# Patient Record
Sex: MALE | Race: BLACK OR AFRICAN AMERICAN | NOT HISPANIC OR LATINO | URBAN - METROPOLITAN AREA
[De-identification: names, ages, dates, MRNs, and addresses within clinical notes are randomized per-mention and may not be internally consistent; named-entity substitution may affect disease eponyms.]

---

## 2017-08-18 NOTE — PATIENT DISCUSSION
(H25.13) Age-related nuclear cataract, bilateral - Assesment : Examination revealed cataract. Moderate OU with visual function affected. Glare testing shows change since last year. - Plan : Monitor for changes. Advised patient to call our office with decreased vision or increased symptoms. Advised nearing time where cataract surgery would become option. RV 1 Year for Cl Check / Complete Exam. Sooner if having problems or changes in vision.

## 2017-08-18 NOTE — PATIENT DISCUSSION
(N95.657) Keratoconjunct sicca, not specified as Sjogren's, bilateral - Assesment : Examination revealed Dry Eye Syndrome OU. - Plan : Monitor for changes. Advised patient to call our office with decreased vision or increased symptoms.

## 2017-11-20 ENCOUNTER — IMPORTED ENCOUNTER (OUTPATIENT)
Dept: URBAN - METROPOLITAN AREA CLINIC 9 | Facility: CLINIC | Age: 72
End: 2017-11-20

## 2017-11-21 ENCOUNTER — IMPORTED ENCOUNTER (OUTPATIENT)
Dept: URBAN - METROPOLITAN AREA CLINIC 9 | Facility: CLINIC | Age: 72
End: 2017-11-21

## 2018-05-22 ENCOUNTER — IMPORTED ENCOUNTER (OUTPATIENT)
Dept: URBAN - METROPOLITAN AREA CLINIC 9 | Facility: CLINIC | Age: 73
End: 2018-05-22

## 2018-08-27 NOTE — PATIENT DISCUSSION
(H25.628) Posterior subcapsular polar age-related cataract, right eye - Assesment : Examination revealed Posterior Subcapsular Polar Senile Cataract. - Plan : See plan #1.

## 2018-08-27 NOTE — PATIENT DISCUSSION
(H25.13) Age-related nuclear cataract, bilateral - Assesment : Examination revealed cataract. Pt is bothered and symptomatic. Monovision CL wearer. - Plan : Advised Pt of condition of cataracts and discussed option of cataract extraction to improve visual function vs updating CLRx. Discussed procedure, risks, and benefits. All questions answered and handout given.  Pt will consider option and call office if wishes to proceed with CE.  RTC in 1 year for CL Exam, sooner if problems, changes, or would like to proceed with CE.

## 2018-08-27 NOTE — PATIENT DISCUSSION
(Z72.0) Tobacco use - Assesment : Pt occasionally smokes cigars. - Plan : Pt aware of health risks associated with smoking.

## 2019-01-02 ENCOUNTER — IMPORTED ENCOUNTER (OUTPATIENT)
Dept: URBAN - METROPOLITAN AREA CLINIC 9 | Facility: CLINIC | Age: 74
End: 2019-01-02

## 2019-01-22 NOTE — PATIENT DISCUSSION
(H25.13) Age-related nuclear cataract, bilateral - Assesment : Examination revealed cataract. Pt is bothered and symptomatic. PATIENT IS USED TO MONOVISION. TARGET OD FOR DISTANCE AND OS FOR READING. ANY CHOICE OF LENS HAS A COMPROMISE. PATIENT WILL COMPROMISE A LITTLE MORE FOR READING.  ****VERIFY ASCAN MEASUREMENTS PRIOR TO FINAL CATARACT SURGERY PLAN***  Monovision CL wearer. MILD ASTIGMATISM. SIGNIFICANT CATARACTS OU. PATIENT FUNCTIONS WELL WITH MONOVISION. PT SAID HE DOESN'T HAVE A LOT OF THE ISSUES THAT HE  READ ABOUT, SUCH AS DOUBLE VISION. PATIENT WILL COMPROMISE FOR READING. ANATOMY OF CATARACT DISCUSSED WITH PATIENT. THE LIGHT SCATTERS AND IT WON'T FOCUS ON THE RETINA PROPERLY. THE QUALITY AND CLARITY OF VISION WILL BE IMPROVED WITH CATARACT SURGERY. PATIENT CAN SELECT A LENS THAT WILL GIVE THE MAXIMUM VISION FOR DISTANCE. UPON FURTHER DISCUSSION IT WOULD MAKE SENSE TO PROCEED WITH MONOVISION. PATIENT AGREES WITH THIS DECISION AND WISHES TO PROCEED WITH MONOVISION AT THE TIME OF CATARACT SURGERY. FOCUSING OD FOR DISTANCE AND OS FOR NEAR - Plan : Risks, Benefits and Alternatives were discussed with patient at length for Cataract Surgery. Visual symptoms are consistent with Cataract findings on examination and current refraction no longer provides satisfactory vision. Patient understands and desires surgery. All questions answered. Risks, Benefits and Alternatives discussed at length for IOL placement. Patient will need to wear glasses for  EYE:OD IOL TYPE: MONOVISION POST OPERATIVE TARGET: DISTANCE  RECOMMENDED PACKAGE:  TP PT PREFERRED PACKAGE:  TP  OS TO FOLLOW WITH A LENS THAT IS STRONGER FOR READING  PATIENT TO TALK TO SURGERY COUNSELOR TODAY.

## 2019-01-30 NOTE — PATIENT DISCUSSION
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 ORA was done in OR on operated eye. - Plan : Discussed signs and symptoms of infection and retinal detachments. Do not rub operated eye.  Follow drop schedule If redness,pain,decreased vision, flashes or floaters occur then contact clinic. 1 WEEK

## 2019-02-04 NOTE — PATIENT DISCUSSION
(H25.12) Age-related nuclear cataract, left eye - Assesment : Examination revealed cataract. Pt is bothered and symptomatic. PATIENT IS USED TO MONOVISION. TARGET OD FOR DISTANCE AND OS FOR READING. ANY CHOICE OF LENS HAS A COMPROMISE. PATIENT WILL COMPROMISE A LITTLE MORE FOR READING.  ****VERIFY ASCAN MEASUREMENTS PRIOR TO FINAL CATARACT SURGERY PLAN***  Monovision CL wearer. MILD ASTIGMATISM. SIGNIFICANT CATARACTS OU. PATIENT FUNCTIONS WELL WITH MONOVISION. PT SAID HE DOESN'T HAVE A LOT OF THE ISSUES THAT HE  READ ABOUT, SUCH AS DOUBLE VISION. PATIENT WILL COMPROMISE FOR READING. ANATOMY OF CATARACT DISCUSSED WITH PATIENT. THE LIGHT SCATTERS AND IT WON'T FOCUS ON THE RETINA PROPERLY. THE QUALITY AND CLARITY OF VISION WILL BE IMPROVED WITH CATARACT SURGERY. PATIENT CAN SELECT A LENS THAT WILL GIVE THE MAXIMUM VISION FOR DISTANCE. UPON FURTHER DISCUSSION IT WOULD MAKE SENSE TO PROCEED WITH MONOVISION. PATIENT AGREES WITH THIS DECISION AND WISHES TO PROCEED WITH MONOVISION AT THE TIME OF CATARACT SURGERY. FOCUSING OD FOR DISTANCE AND OS FOR NEAR. PATIENT IS HAPPY WITH PRESENT LEVEL OF VISION AT THIS TIME. WE COULD HOLD OFF ON CATARACT SURGERY FOR OS AT THIS TIME. OS WOULD GET SOME IMPROVEMENT WITH THE SURGERY BUT PATIENT IS OK WITH HIS READING VISION AT THIS TIME.   PATIENT WOULD LIKE TO HOLD OFF ON SURGERY OS. - Plan : PATIENT IS HAPPY WITH CURRENT LEVEL OF VISION AND WOULD LIKE TO HOLD OFF ON SURGERY OS   3-4 WEEKS

## 2019-02-04 NOTE — PATIENT DISCUSSION
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 NORMAL POST OP APPEARANCE - Plan : Signs and symptoms of infection and retinal detachment are outlined in your surgical packet. Do not rub operated eye. Follow drop schedule. If redness, pain, decreased vision, flashes or floaters occur then contact clinic.

## 2019-03-04 NOTE — PATIENT DISCUSSION
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 - Plan : Signs and symptoms of infection and retinal detachment are outlined in your surgical packet. Do not rub operated eye. Follow drop schedule. If redness, pain, decreased vision, flashes or floaters occur then contact clinic. AUGUST EXAM WITH DR. NOGUEIRA

## 2019-08-19 NOTE — PATIENT DISCUSSION
(H26.061) Other secondary cataract, right eye - Assesment : Mild posterior capsule opacification present. - Plan : Monitor for changes. Advised patient to call our office with decreased vision or increased symptoms.

## 2019-08-19 NOTE — PATIENT DISCUSSION
(H25.12) Age-related nuclear cataract, left eye - Assesment : Examination revealed cataract. Pt not bothered by OS at this time. - Plan : Monitor for changes. Advised patient of condition of cataract OS and discussed symptoms of cataract worsening and becoming more bothersome. Pt to call if vision changes or becomes more bothered by visual symptoms before next appt. RTC in 1 year for Exam, sooner if problems or changes occur.

## 2019-08-19 NOTE — PATIENT DISCUSSION
(V39.330) Vitreous degeneration, left eye - Assesment : Examination revealed PVD. No changes reported. No holes or tears noted today. - Plan : Monitor for changes. Advised patient to call our office with any decreased vision or any increase in flashes and/or floaters.

## 2020-01-24 ENCOUNTER — IMPORTED ENCOUNTER (OUTPATIENT)
Dept: URBAN - METROPOLITAN AREA CLINIC 9 | Facility: CLINIC | Age: 75
End: 2020-01-24

## 2020-10-07 ENCOUNTER — IMPORTED ENCOUNTER (OUTPATIENT)
Dept: URBAN - METROPOLITAN AREA CLINIC 9 | Facility: CLINIC | Age: 75
End: 2020-10-07

## 2021-10-16 ASSESSMENT — VISUAL ACUITY
OS_SC: 20/50 -2 SN
OS_SC: 20/25 - SN
OS_SC: 20/20 -2 SN
OD_CC: 20/20 SN
OS_CC: 20/25 SN
OD_CC: 20/20 -2 SN
OD_CC: 20/20 -2 SN
OS_SC: 20/20 - SN
OS_SC: 20/40 SN
OD_SC: 20/25 -2 SN
OD_SC: 20/25 -2 SN
OD_CC: 20/20 -2 SN
OS_SC: 20/25 - SN
OD_SC: 20/50 +2 SN
OS_CC: 20/20 SN
OD_SC: 20/20 -2 SN
OS_CC: 20/25 -2 SN
OS_CC: 20/20 - SN
OD_SC: 20/40 +2 SN
OD_SC: 20/25 - SN

## 2021-10-16 ASSESSMENT — KERATOMETRY
OD_K2POWER_DIOPTERS: 43.75
OD_K2POWER_DIOPTERS: 43.75
OS_AXISANGLE2_DEGREES: 90
OS_K1POWER_DIOPTERS: 42.75
OD_AXISANGLE_DEGREES: 151
OS_K2POWER_DIOPTERS: 43
OS_K2POWER_DIOPTERS: 43
OS_AXISANGLE_DEGREES: 68
OS_K1POWER_DIOPTERS: 43
OD_K1POWER_DIOPTERS: 43.25
OS_AXISANGLE_DEGREES: 180
OS_AXISANGLE2_DEGREES: 175
OS_K2POWER_DIOPTERS: 43.5
OD_K2POWER_DIOPTERS: 44
OS_AXISANGLE2_DEGREES: 158
OS_AXISANGLE_DEGREES: 180
OS_K1POWER_DIOPTERS: 43.25
OS_K2POWER_DIOPTERS: 43
OS_AXISANGLE2_DEGREES: 90
OS_AXISANGLE_DEGREES: 85
OS_K1POWER_DIOPTERS: 43
OD_K1POWER_DIOPTERS: 43.25
OD_AXISANGLE_DEGREES: 133
OD_AXISANGLE_DEGREES: 127
OD_AXISANGLE2_DEGREES: 64
OD_K1POWER_DIOPTERS: 43.25
OD_K2POWER_DIOPTERS: 43.5
OD_AXISANGLE2_DEGREES: 37
OD_AXISANGLE_DEGREES: 154
OD_AXISANGLE2_DEGREES: 43
OD_AXISANGLE2_DEGREES: 61
OD_K1POWER_DIOPTERS: 43.5

## 2021-10-16 ASSESSMENT — TONOMETRY
OD_IOP_MMHG: 11
OS_IOP_MMHG: 14
OD_IOP_MMHG: 15
OS_IOP_MMHG: 10
OD_IOP_MMHG: 13
OD_IOP_MMHG: 14
OS_IOP_MMHG: 14
OS_IOP_MMHG: 17

## 2021-12-02 ENCOUNTER — ESTABLISHED PATIENT (OUTPATIENT)
Dept: URBAN - METROPOLITAN AREA CLINIC 9 | Facility: CLINIC | Age: 76
End: 2021-12-02

## 2021-12-02 DIAGNOSIS — H52.223: ICD-10-CM

## 2021-12-02 DIAGNOSIS — E11.9: ICD-10-CM

## 2021-12-02 DIAGNOSIS — H43.813: ICD-10-CM

## 2021-12-02 PROCEDURE — 92134 CPTRZ OPH DX IMG PST SGM RTA: CPT

## 2021-12-02 PROCEDURE — 92015 DETERMINE REFRACTIVE STATE: CPT

## 2021-12-02 PROCEDURE — 92014 COMPRE OPH EXAM EST PT 1/>: CPT

## 2021-12-02 ASSESSMENT — KERATOMETRY
OD_K2POWER_DIOPTERS: 43.75
OD_AXISANGLE_DEGREES: 117
OS_AXISANGLE2_DEGREES: 122
OD_K1POWER_DIOPTERS: 43.00
OS_K2POWER_DIOPTERS: 43.25
OS_K1POWER_DIOPTERS: 43.00
OD_AXISANGLE2_DEGREES: 27
OS_AXISANGLE_DEGREES: 32

## 2021-12-02 ASSESSMENT — VISUAL ACUITY
OU_CC: 20/20-1
OU_SC: 20/25-1
OS_SC: 20/40-2
OD_CC: 20/20-2
OS_CC: 20/20-2
OD_SC: 20/30-1

## 2021-12-02 ASSESSMENT — TONOMETRY
OS_IOP_MMHG: 15
OD_IOP_MMHG: 13

## 2022-06-20 RX ORDER — SITAGLIPTIN AND METFORMIN HYDROCHLORIDE 500; 50 MG/1; MG/1
TABLET, FILM COATED ORAL
COMMUNITY

## 2022-06-20 RX ORDER — FLUTICASONE PROPIONATE 50 MCG
SPRAY, SUSPENSION (ML) NASAL
COMMUNITY
Start: 2022-01-07 | End: 2022-09-12 | Stop reason: ALTCHOICE

## 2022-06-20 RX ORDER — EZETIMIBE AND SIMVASTATIN 10; 20 MG/1; MG/1
TABLET ORAL
COMMUNITY

## 2022-06-20 RX ORDER — ASPIRIN 81 MG/1
TABLET, CHEWABLE ORAL
COMMUNITY
End: 2022-09-12 | Stop reason: ALTCHOICE

## 2022-06-20 RX ORDER — DESOXIMETASONE 2.5 MG/G
CREAM TOPICAL
COMMUNITY

## 2022-09-12 PROBLEM — I10 ESSENTIAL HYPERTENSION: Status: ACTIVE | Noted: 2022-09-12

## 2022-09-12 PROBLEM — E78.2 MIXED DYSLIPIDEMIA: Status: ACTIVE | Noted: 2022-09-12

## 2022-09-12 PROBLEM — N52.9 MALE ERECTILE DYSFUNCTION, UNSPECIFIED: Status: ACTIVE | Noted: 2022-09-12

## 2022-09-12 PROBLEM — E11.9 DIABETES MELLITUS, TYPE II (HCC): Status: ACTIVE | Noted: 2022-09-12

## 2022-09-12 PROBLEM — R01.1 SYSTOLIC MURMUR: Status: ACTIVE | Noted: 2022-09-12

## 2022-09-12 PROBLEM — G47.33 OBSTRUCTIVE SLEEP APNEA SYNDROME: Status: ACTIVE | Noted: 2022-09-12

## 2022-09-12 PROBLEM — J30.9 ALLERGIC RHINITIS: Status: ACTIVE | Noted: 2022-09-12

## 2022-09-12 PROBLEM — K21.9 GASTRO-ESOPHAGEAL REFLUX DISEASE WITHOUT ESOPHAGITIS: Status: ACTIVE | Noted: 2022-09-12

## 2022-09-12 PROBLEM — M65.30 ACQUIRED TRIGGER FINGER: Status: ACTIVE | Noted: 2022-09-12

## 2022-09-12 PROBLEM — N40.0 BPH (BENIGN PROSTATIC HYPERPLASIA): Status: ACTIVE | Noted: 2022-09-12

## 2022-12-05 ENCOUNTER — COMPREHENSIVE EXAM (OUTPATIENT)
Dept: URBAN - METROPOLITAN AREA CLINIC 9 | Facility: CLINIC | Age: 77
End: 2022-12-05

## 2022-12-05 PROCEDURE — 92014 COMPRE OPH EXAM EST PT 1/>: CPT

## 2022-12-05 PROCEDURE — 92015 DETERMINE REFRACTIVE STATE: CPT

## 2022-12-05 ASSESSMENT — VISUAL ACUITY
OU_CC: J1+
OU_CC: 20/20
OU_SC: 20/30-1
OS_CC: J1
OS_CC: 20/20
OS_SC: 20/40-2
OD_CC: J1+
OD_CC: 20/20-1
OD_SC: 20/30-2

## 2022-12-05 ASSESSMENT — TONOMETRY
OD_IOP_MMHG: 13
OS_IOP_MMHG: 13

## 2022-12-05 ASSESSMENT — KERATOMETRY
OD_K1POWER_DIOPTERS: 43.00
OS_AXISANGLE_DEGREES: 0
OS_K2POWER_DIOPTERS: 43.00
OS_K1POWER_DIOPTERS: 43.00
OS_AXISANGLE2_DEGREES: 90
OD_K2POWER_DIOPTERS: 44.00
OD_AXISANGLE_DEGREES: 104
OD_AXISANGLE2_DEGREES: 14

## 2023-12-05 ENCOUNTER — COMPREHENSIVE EXAM (OUTPATIENT)
Dept: URBAN - METROPOLITAN AREA CLINIC 14 | Facility: CLINIC | Age: 78
End: 2023-12-05

## 2023-12-05 DIAGNOSIS — Z98.42: ICD-10-CM

## 2023-12-05 DIAGNOSIS — E11.9: ICD-10-CM

## 2023-12-05 DIAGNOSIS — H43.813: ICD-10-CM

## 2023-12-05 DIAGNOSIS — Z98.41: ICD-10-CM

## 2023-12-05 DIAGNOSIS — H04.123: ICD-10-CM

## 2023-12-05 DIAGNOSIS — H52.223: ICD-10-CM

## 2023-12-05 PROCEDURE — 92014 COMPRE OPH EXAM EST PT 1/>: CPT

## 2023-12-05 PROCEDURE — 92015 DETERMINE REFRACTIVE STATE: CPT

## 2023-12-05 ASSESSMENT — KERATOMETRY
OD_AXISANGLE_DEGREES: 98
OS_AXISANGLE2_DEGREES: 161
OS_K1POWER_DIOPTERS: 42.75
OD_K1POWER_DIOPTERS: 42.25
OS_AXISANGLE_DEGREES: 71
OD_K2POWER_DIOPTERS: 43.75
OD_AXISANGLE2_DEGREES: 8
OS_K2POWER_DIOPTERS: 43.00

## 2023-12-05 ASSESSMENT — VISUAL ACUITY
OD_CC: 20/25-1
OS_CC: 20/30+2
OU_CC: 20/20

## 2023-12-05 ASSESSMENT — TONOMETRY
OD_IOP_MMHG: 13
OS_IOP_MMHG: 12

## 2023-12-08 ENCOUNTER — ESTABLISHED PATIENT (OUTPATIENT)
Dept: URBAN - METROPOLITAN AREA CLINIC 11 | Facility: CLINIC | Age: 78
End: 2023-12-08

## 2023-12-08 DIAGNOSIS — H43.393: ICD-10-CM

## 2023-12-08 DIAGNOSIS — H43.813: ICD-10-CM

## 2023-12-08 PROCEDURE — 92201 OPSCPY EXTND RTA DRAW UNI/BI: CPT

## 2023-12-08 PROCEDURE — 99214 OFFICE O/P EST MOD 30 MIN: CPT

## 2023-12-08 ASSESSMENT — KERATOMETRY
OD_AXISANGLE2_DEGREES: 8
OD_K1POWER_DIOPTERS: 42.25
OS_AXISANGLE2_DEGREES: 161
OD_K2POWER_DIOPTERS: 43.75
OS_K2POWER_DIOPTERS: 43.00
OS_K1POWER_DIOPTERS: 42.75
OS_AXISANGLE_DEGREES: 71
OD_AXISANGLE_DEGREES: 98

## 2023-12-08 ASSESSMENT — TONOMETRY
OS_IOP_MMHG: 8
OD_IOP_MMHG: 8

## 2023-12-08 ASSESSMENT — VISUAL ACUITY
OD_CC: 20/30
OS_CC: 20/30-1

## 2023-12-19 ENCOUNTER — POST-OP (OUTPATIENT)
Dept: URBAN - METROPOLITAN AREA CLINIC 18 | Facility: CLINIC | Age: 78
End: 2023-12-19

## 2023-12-19 DIAGNOSIS — H43.812: ICD-10-CM

## 2023-12-19 DIAGNOSIS — Z98.890: ICD-10-CM

## 2023-12-19 DIAGNOSIS — H43.392: ICD-10-CM

## 2023-12-19 PROCEDURE — 99024 POSTOP FOLLOW-UP VISIT: CPT

## 2023-12-19 ASSESSMENT — KERATOMETRY
OD_K1POWER_DIOPTERS: 42.25
OS_K1POWER_DIOPTERS: 42.75
OS_AXISANGLE2_DEGREES: 161
OS_AXISANGLE_DEGREES: 71
OD_K2POWER_DIOPTERS: 43.75
OD_AXISANGLE_DEGREES: 98
OS_K2POWER_DIOPTERS: 43.00
OD_AXISANGLE2_DEGREES: 8

## 2023-12-19 ASSESSMENT — VISUAL ACUITY
OS_CC: 20/20
OD_CC: 20/20

## 2023-12-19 ASSESSMENT — TONOMETRY
OS_IOP_MMHG: 12
OD_IOP_MMHG: 12

## 2024-01-08 ENCOUNTER — POST-OP (OUTPATIENT)
Dept: URBAN - METROPOLITAN AREA CLINIC 11 | Facility: CLINIC | Age: 79
End: 2024-01-08

## 2024-01-08 DIAGNOSIS — Z98.890: ICD-10-CM

## 2024-01-08 DIAGNOSIS — H43.812: ICD-10-CM

## 2024-01-08 DIAGNOSIS — H43.392: ICD-10-CM

## 2024-01-08 PROCEDURE — 99024 POSTOP FOLLOW-UP VISIT: CPT

## 2024-01-08 ASSESSMENT — KERATOMETRY
OD_K2POWER_DIOPTERS: 43.75
OS_K1POWER_DIOPTERS: 42.75
OS_K2POWER_DIOPTERS: 43.00
OD_AXISANGLE_DEGREES: 98
OD_AXISANGLE2_DEGREES: 8
OS_AXISANGLE_DEGREES: 71
OD_K1POWER_DIOPTERS: 42.25
OS_AXISANGLE2_DEGREES: 161

## 2024-01-08 ASSESSMENT — TONOMETRY
OS_IOP_MMHG: 13
OD_IOP_MMHG: 15

## 2024-01-08 ASSESSMENT — VISUAL ACUITY
OD_CC: 20/25+2
OS_CC: 20/20-1
OU_CC: 20/20-1

## 2024-02-05 ENCOUNTER — SURGERY/PROCEDURE (OUTPATIENT)
Dept: URBAN - METROPOLITAN AREA SURGERY 6 | Facility: SURGERY | Age: 79
End: 2024-02-05

## 2024-02-05 DIAGNOSIS — H43.392: ICD-10-CM

## 2024-02-05 PROCEDURE — 67036 REMOVAL OF INNER EYE FLUID: CPT

## 2024-02-06 ENCOUNTER — POST-OP (OUTPATIENT)
Dept: URBAN - METROPOLITAN AREA CLINIC 18 | Facility: CLINIC | Age: 79
End: 2024-02-06

## 2024-02-06 DIAGNOSIS — Z98.890: ICD-10-CM

## 2024-02-06 PROCEDURE — 99024 POSTOP FOLLOW-UP VISIT: CPT

## 2024-02-06 ASSESSMENT — VISUAL ACUITY
OD_CC: 20/20-1
OS_CC: 20/25

## 2024-02-06 ASSESSMENT — KERATOMETRY
OD_K1POWER_DIOPTERS: 42.25
OD_K2POWER_DIOPTERS: 43.75
OD_AXISANGLE2_DEGREES: 8
OD_AXISANGLE_DEGREES: 98
OS_AXISANGLE_DEGREES: 71
OS_K1POWER_DIOPTERS: 42.75
OS_AXISANGLE2_DEGREES: 161
OS_K2POWER_DIOPTERS: 43.00

## 2024-02-06 ASSESSMENT — TONOMETRY
OD_IOP_MMHG: 11
OS_IOP_MMHG: 11

## 2024-03-01 ENCOUNTER — POST-OP (OUTPATIENT)
Dept: URBAN - METROPOLITAN AREA CLINIC 11 | Facility: CLINIC | Age: 79
End: 2024-03-01

## 2024-03-01 DIAGNOSIS — E11.9: ICD-10-CM

## 2024-03-01 DIAGNOSIS — Z98.890: ICD-10-CM

## 2024-03-01 PROCEDURE — 99024 POSTOP FOLLOW-UP VISIT: CPT

## 2024-03-01 ASSESSMENT — TONOMETRY
OD_IOP_MMHG: 14
OS_IOP_MMHG: 16

## 2024-03-01 ASSESSMENT — VISUAL ACUITY
OS_CC: 20/20-2
OD_CC: 20/30

## 2024-06-14 ENCOUNTER — FOLLOW UP (OUTPATIENT)
Dept: URBAN - METROPOLITAN AREA CLINIC 11 | Facility: CLINIC | Age: 79
End: 2024-06-14

## 2024-06-14 DIAGNOSIS — Z98.890: ICD-10-CM

## 2024-06-14 DIAGNOSIS — E11.9: ICD-10-CM

## 2024-06-14 PROCEDURE — 99213 OFFICE O/P EST LOW 20 MIN: CPT

## 2024-06-14 ASSESSMENT — VISUAL ACUITY
OD_CC: 20/20-2
OS_CC: 20/25+1

## 2024-06-14 ASSESSMENT — TONOMETRY
OS_IOP_MMHG: 9
OD_IOP_MMHG: 8

## 2024-07-08 ENCOUNTER — FOLLOW UP (OUTPATIENT)
Dept: URBAN - METROPOLITAN AREA CLINIC 16 | Facility: CLINIC | Age: 79
End: 2024-07-08

## 2024-07-30 ENCOUNTER — COMPREHENSIVE EXAM (OUTPATIENT)
Dept: URBAN - METROPOLITAN AREA CLINIC 10 | Facility: CLINIC | Age: 79
End: 2024-07-30

## 2024-07-30 ASSESSMENT — TONOMETRY
OS_IOP_MMHG: 11
OD_IOP_MMHG: 10

## 2024-07-30 ASSESSMENT — VISUAL ACUITY
OS_CC: 20/40-2
OD_CC: 20/60
OU_CC: 20/40-2

## 2025-07-31 ENCOUNTER — COMPREHENSIVE EXAM (OUTPATIENT)
Age: 80
End: 2025-07-31

## 2025-07-31 DIAGNOSIS — H04.123: ICD-10-CM

## 2025-07-31 DIAGNOSIS — E11.9: ICD-10-CM

## 2025-07-31 DIAGNOSIS — H52.223: ICD-10-CM

## 2025-07-31 PROCEDURE — 92014 COMPRE OPH EXAM EST PT 1/>: CPT

## 2025-07-31 PROCEDURE — 92015 DETERMINE REFRACTIVE STATE: CPT
